# Patient Record
Sex: FEMALE | Race: WHITE
[De-identification: names, ages, dates, MRNs, and addresses within clinical notes are randomized per-mention and may not be internally consistent; named-entity substitution may affect disease eponyms.]

---

## 2019-01-05 ENCOUNTER — HOSPITAL ENCOUNTER (EMERGENCY)
Dept: HOSPITAL 74 - JERFT | Age: 74
Discharge: HOME | End: 2019-01-05
Payer: COMMERCIAL

## 2019-01-05 VITALS — BODY MASS INDEX: 26.5 KG/M2

## 2019-01-05 VITALS — DIASTOLIC BLOOD PRESSURE: 78 MMHG | SYSTOLIC BLOOD PRESSURE: 149 MMHG | HEART RATE: 74 BPM | TEMPERATURE: 97.8 F
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
## 2019-01-05 DIAGNOSIS — S20.222A: Primary | ICD-10-CM
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
## 2019-01-05 DIAGNOSIS — E78.00: ICD-10-CM
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
## 2019-01-06 NOTE — EKG
Test Reason : 

Blood Pressure : ***/*** mmHG

Vent. Rate : 064 BPM     Atrial Rate : 064 BPM

   P-R Int : 144 ms          QRS Dur : 090 ms

    QT Int : 398 ms       P-R-T Axes : 038 -07 003 degrees

   QTc Int : 410 ms

 

NORMAL SINUS RHYTHM

NONSPECIFIC ST ABNORMALITY

BORDERLINE ECG

Confirmed by MD PRUDENCIO, MARY ([id number]) on 1/6/2019 4:58:10 PM

 

Referred By:  DARREN           Confirmed By:MARY FLANNERY MD

## 2022-12-20 ENCOUNTER — OFFICE (OUTPATIENT)
Dept: URBAN - METROPOLITAN AREA CLINIC 30 | Facility: CLINIC | Age: 77
Setting detail: OPHTHALMOLOGY
End: 2022-12-20
Payer: MEDICARE

## 2022-12-20 DIAGNOSIS — H53.2: ICD-10-CM

## 2022-12-20 DIAGNOSIS — H25.12: ICD-10-CM

## 2022-12-20 DIAGNOSIS — H50.22: ICD-10-CM

## 2022-12-20 DIAGNOSIS — H40.1213: ICD-10-CM

## 2022-12-20 DIAGNOSIS — Z96.1: ICD-10-CM

## 2022-12-20 DIAGNOSIS — H40.1222: ICD-10-CM

## 2022-12-20 PROCEDURE — 92012 INTRM OPH EXAM EST PATIENT: CPT | Performed by: OPHTHALMOLOGY

## 2022-12-20 PROCEDURE — 92060 SENSORIMOTOR EXAMINATION: CPT | Performed by: OPHTHALMOLOGY

## 2022-12-20 ASSESSMENT — VISUAL ACUITY
OD_BCVA: 20/50-2
OS_BCVA: 20/25-2

## 2022-12-20 ASSESSMENT — CONFRONTATIONAL VISUAL FIELD TEST (CVF)
OD_FINDINGS: FULL
OS_FINDINGS: FULL

## 2022-12-20 ASSESSMENT — REFRACTION_MANIFEST
OS_VA1: 20/40
OD_CYLINDER: +1.75
OD_VA1: 20/25
OD_VA1: 20/25-2
OD_AXIS: 30
OS_SPHERE: PLANO
OD_SPHERE: -1.00
OS_AXIS: 155
OS_CYLINDER: +0.75
OD_CYLINDER: +0.75
OS_VA1: 20/50-2
OS_SPHERE: 0.00
OD_SPHERE: -1.50
OS_CYLINDER: +0.50
OD_AXIS: 25
OS_AXIS: 150

## 2022-12-20 ASSESSMENT — REFRACTION_CURRENTRX
OS_SPHERE: +2.00
OD_SPHERE: -0.25
OS_CYLINDER: +1.25
OD_CYLINDER: +1.25
OD_CYLINDER: +1.00
OS_OVR_VA: 20/
OD_OVR_VA: 20/
OS_OVR_VA: 20/
OD_AXIS: 15
OS_SPHERE: -0.25
OS_AXIS: 170
OS_CYLINDER: +1.75
OD_AXIS: 180
OD_SPHERE: +2.00
OD_OVR_VA: 20/
OS_AXIS: 160

## 2022-12-20 ASSESSMENT — REFRACTION_AUTOREFRACTION
OD_AXIS: 25
OS_CYLINDER: +0.75
OS_AXIS: 155
OD_CYLINDER: +1.75
OS_SPHERE: 0.00
OD_SPHERE: -1.50

## 2022-12-20 ASSESSMENT — SPHEQUIV_DERIVED
OD_SPHEQUIV: -0.625
OS_SPHEQUIV: 0.375
OD_SPHEQUIV: -0.625
OS_SPHEQUIV: 0.375
OD_SPHEQUIV: -0.625

## 2023-02-08 ENCOUNTER — OFFICE (OUTPATIENT)
Dept: URBAN - METROPOLITAN AREA CLINIC 30 | Facility: CLINIC | Age: 78
Setting detail: OPHTHALMOLOGY
End: 2023-02-08
Payer: MEDICARE

## 2023-02-08 DIAGNOSIS — H25.13: ICD-10-CM

## 2023-02-08 DIAGNOSIS — H40.1222: ICD-10-CM

## 2023-02-08 DIAGNOSIS — H40.1213: ICD-10-CM

## 2023-02-08 DIAGNOSIS — H25.12: ICD-10-CM

## 2023-02-08 DIAGNOSIS — H43.813: ICD-10-CM

## 2023-02-08 DIAGNOSIS — H52.213: ICD-10-CM

## 2023-02-08 DIAGNOSIS — T15.01XA: ICD-10-CM

## 2023-02-08 PROCEDURE — 65222 REMOVE FOREIGN BODY FROM EYE: CPT | Performed by: OPHTHALMOLOGY

## 2023-02-08 PROCEDURE — 92136 OPHTHALMIC BIOMETRY: CPT | Performed by: OPHTHALMOLOGY

## 2023-02-08 PROCEDURE — 92025 CPTRIZED CORNEAL TOPOGRAPHY: CPT | Performed by: OPHTHALMOLOGY

## 2023-02-08 PROCEDURE — 99214 OFFICE O/P EST MOD 30 MIN: CPT | Performed by: OPHTHALMOLOGY

## 2023-02-08 PROCEDURE — 92134 CPTRZ OPH DX IMG PST SGM RTA: CPT | Performed by: OPHTHALMOLOGY

## 2023-02-08 ASSESSMENT — REFRACTION_CURRENTRX
OD_SPHERE: -0.25
OD_AXIS: 180
OS_CYLINDER: +1.25
OS_AXIS: 160
OS_AXIS: 170
OD_AXIS: 15
OD_CYLINDER: +1.00
OS_OVR_VA: 20/
OS_SPHERE: -0.25
OS_SPHERE: +2.00
OD_SPHERE: +2.00
OD_OVR_VA: 20/
OD_OVR_VA: 20/
OS_OVR_VA: 20/
OS_CYLINDER: +1.75
OD_CYLINDER: +1.25

## 2023-02-08 ASSESSMENT — VISUAL ACUITY
OD_BCVA: 20/60
OS_BCVA: 20/30

## 2023-02-08 ASSESSMENT — REFRACTION_MANIFEST
OD_CYLINDER: +0.75
OS_VA1: 20/40
OS_AXIS: 155
OS_VA1: 20/50-2
OD_SPHERE: -1.50
OD_AXIS: 30
OS_CYLINDER: +0.75
OS_SPHERE: PLANO
OD_CYLINDER: +1.75
OS_AXIS: 150
OS_CYLINDER: +0.50
OD_VA1: 20/25-2
OD_VA1: 20/25
OD_AXIS: 25
OS_SPHERE: 0.00
OD_SPHERE: -1.00

## 2023-02-08 ASSESSMENT — SPHEQUIV_DERIVED
OS_SPHEQUIV: 0.375
OD_SPHEQUIV: -0.625
OD_SPHEQUIV: -0.625
OS_SPHEQUIV: 0.375
OD_SPHEQUIV: -0.625

## 2023-02-08 ASSESSMENT — REFRACTION_AUTOREFRACTION
OD_AXIS: 25
OS_SPHERE: 0.00
OD_SPHERE: -1.50
OS_CYLINDER: +0.75
OS_AXIS: 155
OD_CYLINDER: +1.75

## 2023-02-08 ASSESSMENT — CORNEAL TRAUMA - FOREIGN BODY: OD_FOREIGNBODY: PRESENT

## 2023-02-08 ASSESSMENT — CONFRONTATIONAL VISUAL FIELD TEST (CVF)
OS_FINDINGS: FULL
OD_FINDINGS: FULL

## 2023-04-12 ENCOUNTER — OFFICE (OUTPATIENT)
Dept: URBAN - METROPOLITAN AREA CLINIC 30 | Facility: CLINIC | Age: 78
Setting detail: OPHTHALMOLOGY
End: 2023-04-12
Payer: MEDICARE

## 2023-04-12 DIAGNOSIS — H52.213: ICD-10-CM

## 2023-04-12 DIAGNOSIS — H40.1222: ICD-10-CM

## 2023-04-12 DIAGNOSIS — H50.22: ICD-10-CM

## 2023-04-12 DIAGNOSIS — Z96.1: ICD-10-CM

## 2023-04-12 DIAGNOSIS — H43.813: ICD-10-CM

## 2023-04-12 DIAGNOSIS — H53.2: ICD-10-CM

## 2023-04-12 DIAGNOSIS — T15.01XA: ICD-10-CM

## 2023-04-12 DIAGNOSIS — H40.1213: ICD-10-CM

## 2023-04-12 DIAGNOSIS — H25.12: ICD-10-CM

## 2023-04-12 DIAGNOSIS — E11.9: ICD-10-CM

## 2023-04-12 PROCEDURE — 92020 GONIOSCOPY: CPT | Performed by: OPHTHALMOLOGY

## 2023-04-12 PROCEDURE — 99213 OFFICE O/P EST LOW 20 MIN: CPT | Performed by: OPHTHALMOLOGY

## 2023-04-12 ASSESSMENT — REFRACTION_CURRENTRX
OS_OVR_VA: 20/
OD_SPHERE: +2.00
OS_AXIS: 160
OS_CYLINDER: +1.75
OD_OVR_VA: 20/
OS_OVR_VA: 20/
OD_CYLINDER: +1.25
OS_CYLINDER: +1.25
OD_CYLINDER: +1.00
OD_AXIS: 180
OD_SPHERE: -0.25
OD_AXIS: 15
OS_SPHERE: +2.00
OS_AXIS: 170
OD_OVR_VA: 20/
OS_SPHERE: -0.25

## 2023-04-12 ASSESSMENT — CORNEAL TRAUMA - FOREIGN BODY: OD_FOREIGNBODY: PRESENT

## 2023-04-12 ASSESSMENT — REFRACTION_MANIFEST
OS_SPHERE: PLANO
OS_VA1: 20/40
OS_AXIS: 155
OD_AXIS: 25
OS_CYLINDER: +0.75
OS_SPHERE: 0.00
OD_CYLINDER: +0.75
OS_AXIS: 150
OD_AXIS: 30
OS_CYLINDER: +0.50
OD_SPHERE: -1.00
OD_CYLINDER: +1.75
OS_VA1: 20/50-2
OD_VA1: 20/25-2
OD_VA1: 20/25
OD_SPHERE: -1.50

## 2023-04-12 ASSESSMENT — REFRACTION_AUTOREFRACTION
OD_CYLINDER: +1.75
OS_AXIS: 155
OD_AXIS: 25
OS_SPHERE: 0.00
OD_SPHERE: -1.50
OS_CYLINDER: +0.75

## 2023-04-12 ASSESSMENT — VISUAL ACUITY
OS_BCVA: 20/30
OD_BCVA: 20/60

## 2023-04-12 ASSESSMENT — SPHEQUIV_DERIVED
OS_SPHEQUIV: 0.375
OD_SPHEQUIV: -0.625
OS_SPHEQUIV: 0.375
OD_SPHEQUIV: -0.625
OD_SPHEQUIV: -0.625

## 2023-05-25 ENCOUNTER — AMBULATORY SURGERY CENTER (OUTPATIENT)
Dept: URBAN - METROPOLITAN AREA SURGERY 17 | Facility: SURGERY | Age: 78
Setting detail: OPHTHALMOLOGY
End: 2023-05-25
Payer: MEDICARE

## 2023-05-25 DIAGNOSIS — H25.12: ICD-10-CM

## 2023-05-25 DIAGNOSIS — H40.1222: ICD-10-CM

## 2023-05-25 PROCEDURE — 66984 XCAPSL CTRC RMVL W/O ECP: CPT | Performed by: OPHTHALMOLOGY

## 2023-05-25 PROCEDURE — 66170 GLAUCOMA SURGERY: CPT | Performed by: OPHTHALMOLOGY

## 2023-05-26 ENCOUNTER — OFFICE (OUTPATIENT)
Dept: URBAN - METROPOLITAN AREA CLINIC 29 | Facility: CLINIC | Age: 78
Setting detail: OPHTHALMOLOGY
End: 2023-05-26
Payer: MEDICARE

## 2023-05-26 ENCOUNTER — RX ONLY (RX ONLY)
Age: 78
End: 2023-05-26

## 2023-05-26 DIAGNOSIS — H43.813: ICD-10-CM

## 2023-05-26 DIAGNOSIS — H40.1222: ICD-10-CM

## 2023-05-26 DIAGNOSIS — H16.222: ICD-10-CM

## 2023-05-26 DIAGNOSIS — H52.213: ICD-10-CM

## 2023-05-26 DIAGNOSIS — E11.9: ICD-10-CM

## 2023-05-26 DIAGNOSIS — H25.12: ICD-10-CM

## 2023-05-26 DIAGNOSIS — H53.2: ICD-10-CM

## 2023-05-26 DIAGNOSIS — H40.1213: ICD-10-CM

## 2023-05-26 DIAGNOSIS — Z96.1: ICD-10-CM

## 2023-05-26 DIAGNOSIS — H50.22: ICD-10-CM

## 2023-05-26 PROCEDURE — 99024 POSTOP FOLLOW-UP VISIT: CPT | Performed by: OPHTHALMOLOGY

## 2023-05-26 ASSESSMENT — REFRACTION_MANIFEST
OS_CYLINDER: +0.75
OS_SPHERE: 0.00
OD_SPHERE: -1.50
OD_VA1: 20/25
OD_CYLINDER: +1.75
OS_SPHERE: PLANO
OD_AXIS: 25
OS_AXIS: 155
OS_VA1: 20/40
OD_SPHERE: -1.00
OD_CYLINDER: +0.75
OS_CYLINDER: +0.50
OD_AXIS: 30
OS_AXIS: 150
OS_VA1: 20/50-2
OD_VA1: 20/25-2

## 2023-05-26 ASSESSMENT — REFRACTION_CURRENTRX
OD_SPHERE: -0.25
OS_OVR_VA: 20/
OS_SPHERE: +2.00
OD_CYLINDER: +1.00
OS_AXIS: 170
OS_CYLINDER: +1.75
OD_OVR_VA: 20/
OD_AXIS: 15
OS_SPHERE: -0.25
OD_SPHERE: +2.00
OD_CYLINDER: +1.25
OS_AXIS: 160
OS_CYLINDER: +1.25
OD_AXIS: 180
OS_OVR_VA: 20/
OD_OVR_VA: 20/

## 2023-05-26 ASSESSMENT — REFRACTION_AUTOREFRACTION
OD_CYLINDER: +1.75
OS_AXIS: 155
OD_AXIS: 25
OS_CYLINDER: +0.75
OD_SPHERE: -1.50
OS_SPHERE: 0.00

## 2023-05-26 ASSESSMENT — VISUAL ACUITY
OS_BCVA: 20/30
OD_BCVA: 20/60

## 2023-05-26 ASSESSMENT — CONFRONTATIONAL VISUAL FIELD TEST (CVF)
OS_FINDINGS: FULL
OD_FINDINGS: FULL

## 2023-05-26 ASSESSMENT — TONOMETRY: OD_IOP_MMHG: 12

## 2023-05-26 ASSESSMENT — CORNEAL TRAUMA - FOREIGN BODY: OD_FOREIGNBODY: PRESENT

## 2023-05-26 ASSESSMENT — SUPERFICIAL PUNCTATE KERATITIS (SPK): OS_SPK: 2+

## 2023-05-26 ASSESSMENT — SPHEQUIV_DERIVED
OS_SPHEQUIV: 0.375
OD_SPHEQUIV: -0.625
OD_SPHEQUIV: -0.625
OS_SPHEQUIV: 0.375
OD_SPHEQUIV: -0.625

## 2023-06-14 ENCOUNTER — OFFICE (OUTPATIENT)
Dept: URBAN - METROPOLITAN AREA CLINIC 30 | Facility: CLINIC | Age: 78
Setting detail: OPHTHALMOLOGY
End: 2023-06-14
Payer: MEDICARE

## 2023-06-14 DIAGNOSIS — H16.222: ICD-10-CM

## 2023-06-14 DIAGNOSIS — Z96.1: ICD-10-CM

## 2023-06-14 DIAGNOSIS — H43.813: ICD-10-CM

## 2023-06-14 DIAGNOSIS — H40.1213: ICD-10-CM

## 2023-06-14 DIAGNOSIS — H25.12: ICD-10-CM

## 2023-06-14 DIAGNOSIS — H50.22: ICD-10-CM

## 2023-06-14 DIAGNOSIS — H52.213: ICD-10-CM

## 2023-06-14 DIAGNOSIS — H53.2: ICD-10-CM

## 2023-06-14 DIAGNOSIS — H40.1222: ICD-10-CM

## 2023-06-14 DIAGNOSIS — E11.9: ICD-10-CM

## 2023-06-14 PROCEDURE — 99024 POSTOP FOLLOW-UP VISIT: CPT | Performed by: OPHTHALMOLOGY

## 2023-06-14 ASSESSMENT — REFRACTION_CURRENTRX
OS_CYLINDER: +1.25
OS_OVR_VA: 20/
OD_OVR_VA: 20/
OS_CYLINDER: +1.75
OD_SPHERE: +2.00
OD_OVR_VA: 20/
OS_SPHERE: -0.25
OS_AXIS: 170
OS_SPHERE: +2.00
OS_AXIS: 160
OD_SPHERE: -0.25
OD_CYLINDER: +1.25
OD_AXIS: 15
OD_AXIS: 180
OD_CYLINDER: +1.00
OS_OVR_VA: 20/

## 2023-06-14 ASSESSMENT — REFRACTION_MANIFEST
OS_AXIS: 155
OS_AXIS: 150
OS_CYLINDER: +0.50
OD_CYLINDER: +0.75
OD_AXIS: 30
OS_CYLINDER: +0.75
OS_SPHERE: 0.00
OD_VA1: 20/25
OD_CYLINDER: +1.75
OS_VA1: 20/50-2
OS_SPHERE: PLANO
OD_AXIS: 25
OD_VA1: 20/25-2
OD_SPHERE: -1.50
OS_VA1: 20/40
OD_SPHERE: -1.00

## 2023-06-14 ASSESSMENT — REFRACTION_AUTOREFRACTION
OD_AXIS: 25
OS_CYLINDER: +0.75
OD_CYLINDER: +1.75
OS_AXIS: 155
OD_SPHERE: -1.50
OS_SPHERE: 0.00

## 2023-06-14 ASSESSMENT — SPHEQUIV_DERIVED
OD_SPHEQUIV: -0.625
OS_SPHEQUIV: 0.375
OS_SPHEQUIV: 0.375

## 2023-06-14 ASSESSMENT — VISUAL ACUITY
OD_BCVA: 20/50
OS_BCVA: 20/30-1

## 2023-06-14 ASSESSMENT — CORNEAL TRAUMA - FOREIGN BODY: OD_FOREIGNBODY: PRESENT

## 2023-06-14 ASSESSMENT — SUPERFICIAL PUNCTATE KERATITIS (SPK): OS_SPK: 2+

## 2023-07-19 ENCOUNTER — OFFICE (OUTPATIENT)
Dept: URBAN - METROPOLITAN AREA CLINIC 30 | Facility: CLINIC | Age: 78
Setting detail: OPHTHALMOLOGY
End: 2023-07-19
Payer: MEDICARE

## 2023-07-19 DIAGNOSIS — H40.1222: ICD-10-CM

## 2023-07-19 DIAGNOSIS — H43.813: ICD-10-CM

## 2023-07-19 PROCEDURE — 99213 OFFICE O/P EST LOW 20 MIN: CPT | Performed by: OPHTHALMOLOGY

## 2023-07-19 PROCEDURE — 92134 CPTRZ OPH DX IMG PST SGM RTA: CPT | Performed by: OPHTHALMOLOGY

## 2023-07-19 ASSESSMENT — REFRACTION_MANIFEST
OS_CYLINDER: +0.50
OD_SPHERE: -1.00
OD_VA1: 20/25-2
OD_AXIS: 30
OD_AXIS: 30
OD_VPRISM: BU
OS_SPHERE: PLANO
OD_CYLINDER: +0.75
OS_VPRISM: BD
OD_CYLINDER: +1.00
OD_SPHERE: -0.75
OS_SPHERE: -1.00
OD_VA1: 20/25
OS_VA1: 20/40
OS_VA1: 20/30-2
OS_CYLINDER: +0.50
OS_AXIS: 10
OS_AXIS: 150

## 2023-07-19 ASSESSMENT — SPHEQUIV_DERIVED
OD_SPHEQUIV: -0.25
OS_SPHEQUIV: -0.75
OD_SPHEQUIV: -0.125
OD_SPHEQUIV: -0.625
OS_SPHEQUIV: -0.75

## 2023-07-19 ASSESSMENT — REFRACTION_AUTOREFRACTION
OD_CYLINDER: +1.75
OS_SPHERE: -1.00
OS_AXIS: 11
OS_CYLINDER: +0.50
OD_SPHERE: -1.00
OD_AXIS: 28

## 2023-07-19 ASSESSMENT — REFRACTION_CURRENTRX
OS_AXIS: 170
OS_SPHERE: -0.25
OS_OVR_VA: 20/
OD_SPHERE: -0.25
OD_AXIS: 180
OD_CYLINDER: +1.25
OS_CYLINDER: +1.75
OS_SPHERE: +2.00
OD_OVR_VA: 20/
OD_AXIS: 15
OD_SPHERE: +2.00
OS_AXIS: 160
OS_OVR_VA: 20/
OS_CYLINDER: +1.25
OD_OVR_VA: 20/
OD_CYLINDER: +1.00

## 2023-07-19 ASSESSMENT — SUPERFICIAL PUNCTATE KERATITIS (SPK): OS_SPK: 2+

## 2023-07-19 ASSESSMENT — CONFRONTATIONAL VISUAL FIELD TEST (CVF)
OS_FINDINGS: FULL
OD_FINDINGS: FULL

## 2023-07-19 ASSESSMENT — VISUAL ACUITY
OS_BCVA: 20/40-1
OD_BCVA: 20/30-2

## 2023-07-19 ASSESSMENT — CORNEAL TRAUMA - FOREIGN BODY: OD_FOREIGNBODY: PRESENT

## 2023-10-25 ENCOUNTER — OFFICE (OUTPATIENT)
Dept: URBAN - METROPOLITAN AREA CLINIC 30 | Facility: CLINIC | Age: 78
Setting detail: OPHTHALMOLOGY
End: 2023-10-25
Payer: MEDICARE

## 2023-10-25 DIAGNOSIS — H40.1222: ICD-10-CM

## 2023-10-25 DIAGNOSIS — H16.222: ICD-10-CM

## 2023-10-25 DIAGNOSIS — H43.813: ICD-10-CM

## 2023-10-25 DIAGNOSIS — H40.1213: ICD-10-CM

## 2023-10-25 PROCEDURE — 99214 OFFICE O/P EST MOD 30 MIN: CPT | Performed by: OPHTHALMOLOGY

## 2023-10-25 PROCEDURE — 92083 EXTENDED VISUAL FIELD XM: CPT | Performed by: OPHTHALMOLOGY

## 2023-10-25 PROCEDURE — 92133 CPTRZD OPH DX IMG PST SGM ON: CPT | Performed by: OPHTHALMOLOGY

## 2023-10-25 ASSESSMENT — REFRACTION_MANIFEST
OS_VPRISM: BD
OS_CYLINDER: +0.50
OS_CYLINDER: +0.50
OD_VA1: 20/25-2
OS_VA1: 20/30-2
OD_VPRISM: BU
OD_VA1: 20/25
OD_CYLINDER: +0.75
OD_CYLINDER: +1.00
OD_AXIS: 30
OS_SPHERE: PLANO
OS_VA1: 20/40
OS_AXIS: 150
OS_SPHERE: -1.00
OD_AXIS: 30
OS_AXIS: 10
OD_SPHERE: -0.75
OD_SPHERE: -1.00

## 2023-10-25 ASSESSMENT — REFRACTION_CURRENTRX
OD_AXIS: 15
OS_OVR_VA: 20/
OS_SPHERE: +2.00
OS_CYLINDER: +1.25
OS_OVR_VA: 20/
OD_AXIS: 180
OD_OVR_VA: 20/
OS_AXIS: 170
OS_SPHERE: -0.25
OD_SPHERE: +2.00
OD_CYLINDER: +1.25
OS_CYLINDER: +1.75
OD_CYLINDER: +1.00
OD_SPHERE: -0.25
OS_AXIS: 160
OD_OVR_VA: 20/

## 2023-10-25 ASSESSMENT — CONFRONTATIONAL VISUAL FIELD TEST (CVF)
OS_FINDINGS: FULL
OD_FINDINGS: FULL

## 2023-10-25 ASSESSMENT — REFRACTION_AUTOREFRACTION
OD_AXIS: 28
OS_CYLINDER: +0.50
OS_SPHERE: -1.00
OS_AXIS: 11
OD_SPHERE: -1.00
OD_CYLINDER: +1.75

## 2023-10-25 ASSESSMENT — CORNEAL TRAUMA - FOREIGN BODY: OD_FOREIGNBODY: PRESENT

## 2023-10-25 ASSESSMENT — TONOMETRY
OS_IOP_MMHG: 13
OD_IOP_MMHG: 12

## 2023-10-25 ASSESSMENT — SPHEQUIV_DERIVED
OD_SPHEQUIV: -0.125
OS_SPHEQUIV: -0.75
OD_SPHEQUIV: -0.625
OD_SPHEQUIV: -0.25
OS_SPHEQUIV: -0.75

## 2023-10-25 ASSESSMENT — SUPERFICIAL PUNCTATE KERATITIS (SPK): OS_SPK: 2+

## 2023-10-25 ASSESSMENT — VISUAL ACUITY
OD_BCVA: 20/40-2
OS_BCVA: 20/40-1

## 2024-02-21 ENCOUNTER — OFFICE (OUTPATIENT)
Dept: URBAN - METROPOLITAN AREA CLINIC 30 | Facility: CLINIC | Age: 79
Setting detail: OPHTHALMOLOGY
End: 2024-02-21
Payer: MEDICARE

## 2024-02-21 DIAGNOSIS — H40.1213: ICD-10-CM

## 2024-02-21 DIAGNOSIS — Z96.1: ICD-10-CM

## 2024-02-21 DIAGNOSIS — H50.22: ICD-10-CM

## 2024-02-21 DIAGNOSIS — H43.813: ICD-10-CM

## 2024-02-21 DIAGNOSIS — H16.223: ICD-10-CM

## 2024-02-21 DIAGNOSIS — H40.1222: ICD-10-CM

## 2024-02-21 DIAGNOSIS — H53.2: ICD-10-CM

## 2024-02-21 DIAGNOSIS — H52.213: ICD-10-CM

## 2024-02-21 DIAGNOSIS — E11.9: ICD-10-CM

## 2024-02-21 PROCEDURE — 92020 GONIOSCOPY: CPT | Performed by: OPHTHALMOLOGY

## 2024-02-21 PROCEDURE — 92012 INTRM OPH EXAM EST PATIENT: CPT | Performed by: OPHTHALMOLOGY

## 2024-02-21 ASSESSMENT — REFRACTION_MANIFEST
OS_AXIS: 150
OD_CYLINDER: +1.00
OS_AXIS: 10
OS_CYLINDER: +0.50
OS_VA1: 20/40
OS_SPHERE: PLANO
OS_VPRISM: BD
OS_SPHERE: -1.00
OD_SPHERE: -1.00
OD_VPRISM: BU
OS_VA1: 20/30-2
OD_VA1: 20/25
OD_CYLINDER: +0.75
OD_AXIS: 30
OD_AXIS: 30
OD_SPHERE: -0.75
OD_VA1: 20/25-2
OS_CYLINDER: +0.50

## 2024-02-21 ASSESSMENT — REFRACTION_CURRENTRX
OS_OVR_VA: 20/
OS_AXIS: 160
OD_VPRISM: BU
OD_AXIS: 180
OS_CYLINDER: +1.25
OD_SPHERE: -0.75
OS_SPHERE: -1.00
OD_SPHERE: +2.00
OD_CYLINDER: +1.00
OD_OVR_VA: 20/
OD_AXIS: 15
OD_CYLINDER: +1.00
OD_OVR_VA: 20/
OS_OVR_VA: 20/
OS_CYLINDER: +1.75
OS_VPRISM: BD
OS_OVR_VA: 20/
OD_OVR_VA: 20/
OD_SPHERE: -0.25
OS_SPHERE: +2.00
OD_AXIS: 30
OD_CYLINDER: +1.25
OS_CYLINDER: +0.50
OS_AXIS: 170
OS_SPHERE: -0.25
OS_AXIS: 10

## 2024-02-21 ASSESSMENT — SPHEQUIV_DERIVED
OD_SPHEQUIV: -0.625
OD_SPHEQUIV: -0.125
OS_SPHEQUIV: -0.75
OD_SPHEQUIV: -0.25
OS_SPHEQUIV: -0.75

## 2024-02-21 ASSESSMENT — REFRACTION_AUTOREFRACTION
OD_CYLINDER: +1.75
OS_SPHERE: -1.00
OD_AXIS: 28
OD_SPHERE: -1.00
OS_AXIS: 11
OS_CYLINDER: +0.50

## 2024-02-21 ASSESSMENT — CONFRONTATIONAL VISUAL FIELD TEST (CVF)
OD_FINDINGS: CONSTRICTION
OS_FINDINGS: FULL

## 2024-02-21 ASSESSMENT — SUPERFICIAL PUNCTATE KERATITIS (SPK)
OD_SPK: 2+
OS_SPK: 2+

## 2024-06-12 ENCOUNTER — OFFICE (OUTPATIENT)
Dept: URBAN - METROPOLITAN AREA CLINIC 30 | Facility: CLINIC | Age: 79
Setting detail: OPHTHALMOLOGY
End: 2024-06-12
Payer: MEDICARE

## 2024-06-12 DIAGNOSIS — H50.22: ICD-10-CM

## 2024-06-12 DIAGNOSIS — H40.1213: ICD-10-CM

## 2024-06-12 DIAGNOSIS — H40.1222: ICD-10-CM

## 2024-06-12 DIAGNOSIS — E11.9: ICD-10-CM

## 2024-06-12 DIAGNOSIS — Z96.1: ICD-10-CM

## 2024-06-12 DIAGNOSIS — H16.223: ICD-10-CM

## 2024-06-12 DIAGNOSIS — H52.213: ICD-10-CM

## 2024-06-12 DIAGNOSIS — H43.813: ICD-10-CM

## 2024-06-12 DIAGNOSIS — H53.2: ICD-10-CM

## 2024-06-12 PROCEDURE — 92083 EXTENDED VISUAL FIELD XM: CPT | Performed by: OPHTHALMOLOGY

## 2024-06-12 PROCEDURE — 92014 COMPRE OPH EXAM EST PT 1/>: CPT | Performed by: OPHTHALMOLOGY

## 2024-06-12 ASSESSMENT — CONFRONTATIONAL VISUAL FIELD TEST (CVF)
OS_FINDINGS: FULL
OD_FINDINGS: CONSTRICTION

## 2024-10-09 ENCOUNTER — OFFICE (OUTPATIENT)
Facility: LOCATION | Age: 79
Setting detail: OPHTHALMOLOGY
End: 2024-10-09
Payer: MEDICARE

## 2024-10-09 DIAGNOSIS — H50.22: ICD-10-CM

## 2024-10-09 DIAGNOSIS — E11.9: ICD-10-CM

## 2024-10-09 DIAGNOSIS — H53.2: ICD-10-CM

## 2024-10-09 DIAGNOSIS — H40.1222: ICD-10-CM

## 2024-10-09 DIAGNOSIS — H52.213: ICD-10-CM

## 2024-10-09 DIAGNOSIS — H16.223: ICD-10-CM

## 2024-10-09 DIAGNOSIS — H40.1213: ICD-10-CM

## 2024-10-09 DIAGNOSIS — H43.813: ICD-10-CM

## 2024-10-09 DIAGNOSIS — Z96.1: ICD-10-CM

## 2024-10-09 PROCEDURE — 99214 OFFICE O/P EST MOD 30 MIN: CPT | Performed by: OPHTHALMOLOGY

## 2024-10-09 PROCEDURE — 92133 CPTRZD OPH DX IMG PST SGM ON: CPT | Performed by: OPHTHALMOLOGY

## 2024-10-09 ASSESSMENT — REFRACTION_CURRENTRX
OS_AXIS: 10
OS_CYLINDER: +1.25
OD_OVR_VA: 20/
OD_VPRISM: BU
OD_SPHERE: +2.00
OD_OVR_VA: 20/
OD_CYLINDER: +1.00
OD_SPHERE: -0.75
OS_OVR_VA: 20/
OS_OVR_VA: 20/
OD_OVR_VA: 20/
OS_AXIS: 160
OS_SPHERE: -1.00
OS_VPRISM: BD
OS_CYLINDER: +0.50
OS_OVR_VA: 20/
OD_AXIS: 30
OD_SPHERE: -0.25
OD_AXIS: 180
OD_AXIS: 15
OD_CYLINDER: +1.25
OS_SPHERE: +2.00
OS_AXIS: 170
OS_CYLINDER: +1.75
OD_CYLINDER: +1.00
OS_SPHERE: -0.25

## 2024-10-09 ASSESSMENT — REFRACTION_MANIFEST
OS_AXIS: 10
OS_SPHERE: PLANO
OS_CYLINDER: +0.50
OD_SPHERE: -0.75
OS_VA1: 20/40
OS_CYLINDER: +0.50
OD_AXIS: 30
OS_AXIS: 150
OS_SPHERE: -1.00
OD_VPRISM: BU
OS_VA1: 20/30-2
OD_VA1: 20/25-2
OD_CYLINDER: +1.00
OD_CYLINDER: +0.75
OS_VPRISM: BD
OD_SPHERE: -1.00
OD_AXIS: 30
OD_VA1: 20/25

## 2024-10-09 ASSESSMENT — CONFRONTATIONAL VISUAL FIELD TEST (CVF)
OD_FINDINGS: FULL
OS_FINDINGS: FULL

## 2024-10-09 ASSESSMENT — REFRACTION_AUTOREFRACTION
OS_CYLINDER: +0.50
OS_AXIS: 11
OD_SPHERE: -1.00
OD_AXIS: 28
OD_CYLINDER: +1.75
OS_SPHERE: -1.00

## 2024-10-09 ASSESSMENT — SUPERFICIAL PUNCTATE KERATITIS (SPK)
OS_SPK: T
OD_SPK: T

## 2024-10-09 ASSESSMENT — VISUAL ACUITY
OS_BCVA: 20/30-2
OD_BCVA: 20/50+2

## 2025-01-15 ENCOUNTER — OFFICE (OUTPATIENT)
Facility: LOCATION | Age: 80
Setting detail: OPHTHALMOLOGY
End: 2025-01-15
Payer: MEDICARE

## 2025-01-15 DIAGNOSIS — H40.1213: ICD-10-CM

## 2025-01-15 DIAGNOSIS — Z96.1: ICD-10-CM

## 2025-01-15 DIAGNOSIS — H40.1222: ICD-10-CM

## 2025-01-15 DIAGNOSIS — H50.22: ICD-10-CM

## 2025-01-15 DIAGNOSIS — H43.813: ICD-10-CM

## 2025-01-15 DIAGNOSIS — H52.213: ICD-10-CM

## 2025-01-15 DIAGNOSIS — E11.9: ICD-10-CM

## 2025-01-15 DIAGNOSIS — H53.2: ICD-10-CM

## 2025-01-15 DIAGNOSIS — H16.223: ICD-10-CM

## 2025-01-15 PROCEDURE — 99213 OFFICE O/P EST LOW 20 MIN: CPT | Performed by: OPHTHALMOLOGY

## 2025-01-15 PROCEDURE — 92083 EXTENDED VISUAL FIELD XM: CPT | Performed by: OPHTHALMOLOGY

## 2025-01-15 PROCEDURE — 92020 GONIOSCOPY: CPT | Performed by: OPHTHALMOLOGY

## 2025-01-15 ASSESSMENT — REFRACTION_AUTOREFRACTION
OS_AXIS: 11
OD_CYLINDER: +1.75
OS_SPHERE: -1.00
OD_SPHERE: -1.00
OS_CYLINDER: +0.50
OD_AXIS: 28

## 2025-01-15 ASSESSMENT — REFRACTION_MANIFEST
OS_CYLINDER: +0.50
OS_AXIS: 10
OD_VA1: 20/25
OD_VPRISM: BU
OS_VPRISM: BD
OS_CYLINDER: +0.50
OD_VA1: 20/25-2
OD_CYLINDER: +1.00
OD_SPHERE: -0.75
OD_SPHERE: -1.00
OD_CYLINDER: +0.75
OS_VA1: 20/30-2
OD_AXIS: 30
OS_VA1: 20/40
OS_SPHERE: -1.00
OS_SPHERE: PLANO
OD_AXIS: 30
OS_AXIS: 150

## 2025-01-15 ASSESSMENT — REFRACTION_CURRENTRX
OD_SPHERE: -0.25
OS_OVR_VA: 20/
OD_VPRISM: BU
OS_OVR_VA: 20/
OD_OVR_VA: 20/
OD_OVR_VA: 20/
OS_SPHERE: -0.25
OS_CYLINDER: +1.25
OS_CYLINDER: +1.75
OS_AXIS: 170
OS_SPHERE: +2.00
OS_VPRISM: BD
OD_CYLINDER: +1.00
OD_AXIS: 15
OD_SPHERE: +2.00
OS_AXIS: 10
OD_SPHERE: -0.75
OS_CYLINDER: +0.50
OD_CYLINDER: +1.00
OD_AXIS: 30
OS_AXIS: 160
OD_AXIS: 180
OS_SPHERE: -1.00
OS_OVR_VA: 20/
OD_OVR_VA: 20/
OD_CYLINDER: +1.25

## 2025-01-15 ASSESSMENT — CONFRONTATIONAL VISUAL FIELD TEST (CVF)
OS_FINDINGS: FULL
OD_FINDINGS: FULL

## 2025-01-15 ASSESSMENT — VISUAL ACUITY
OS_BCVA: 20/40
OD_BCVA: 20/50+2

## 2025-01-15 ASSESSMENT — SUPERFICIAL PUNCTATE KERATITIS (SPK)
OD_SPK: T
OS_SPK: T

## 2025-01-15 ASSESSMENT — TONOMETRY: OD_IOP_MMHG: 12

## 2025-05-14 ENCOUNTER — OFFICE (OUTPATIENT)
Facility: LOCATION | Age: 80
Setting detail: OPHTHALMOLOGY
End: 2025-05-14
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
## 2025-05-14 PROCEDURE — NO SHOW FE NO SHOW FEE: Performed by: OPHTHALMOLOGY

## 2025-06-02 ENCOUNTER — OFFICE (OUTPATIENT)
Facility: LOCATION | Age: 80
Setting detail: OPHTHALMOLOGY
End: 2025-06-02
Payer: MEDICARE

## 2025-06-02 DIAGNOSIS — Z96.1: ICD-10-CM

## 2025-06-02 DIAGNOSIS — H53.2: ICD-10-CM

## 2025-06-02 DIAGNOSIS — H40.1222: ICD-10-CM

## 2025-06-02 DIAGNOSIS — H52.213: ICD-10-CM

## 2025-06-02 DIAGNOSIS — E11.9: ICD-10-CM

## 2025-06-02 DIAGNOSIS — H00.11: ICD-10-CM

## 2025-06-02 DIAGNOSIS — H43.813: ICD-10-CM

## 2025-06-02 DIAGNOSIS — H16.223: ICD-10-CM

## 2025-06-02 DIAGNOSIS — H40.1213: ICD-10-CM

## 2025-06-02 DIAGNOSIS — H50.22: ICD-10-CM

## 2025-06-02 PROCEDURE — 92012 INTRM OPH EXAM EST PATIENT: CPT | Performed by: OPHTHALMOLOGY

## 2025-06-02 ASSESSMENT — REFRACTION_AUTOREFRACTION
OS_SPHERE: -1.00
OD_AXIS: 28
OD_CYLINDER: +1.75
OS_CYLINDER: +0.50
OS_AXIS: 11
OD_SPHERE: -1.00

## 2025-06-02 ASSESSMENT — REFRACTION_MANIFEST
OD_SPHERE: -1.00
OD_VA1: 20/25
OD_SPHERE: -0.75
OD_CYLINDER: +1.00
OS_SPHERE: -1.00
OD_VPRISM: BU
OS_SPHERE: PLANO
OS_AXIS: 10
OD_AXIS: 30
OS_VA1: 20/30-2
OD_VA1: 20/25-2
OD_CYLINDER: +0.75
OS_AXIS: 150
OS_CYLINDER: +0.50
OS_CYLINDER: +0.50
OS_VPRISM: BD
OD_AXIS: 30
OS_VA1: 20/40

## 2025-06-02 ASSESSMENT — REFRACTION_CURRENTRX
OD_CYLINDER: +1.00
OS_OVR_VA: 20/
OD_CYLINDER: +1.25
OS_SPHERE: -1.00
OS_SPHERE: -0.25
OD_AXIS: 15
OD_AXIS: 180
OD_OVR_VA: 20/
OD_OVR_VA: 20/
OD_SPHERE: +2.00
OD_SPHERE: -0.25
OS_VPRISM: BD
OS_CYLINDER: +1.75
OS_OVR_VA: 20/
OS_AXIS: 10
OD_OVR_VA: 20/
OS_OVR_VA: 20/
OS_AXIS: 160
OS_CYLINDER: +1.25
OD_VPRISM: BU
OS_SPHERE: +2.00
OS_CYLINDER: +0.50
OS_AXIS: 170
OD_AXIS: 30
OD_SPHERE: -0.75
OD_CYLINDER: +1.00

## 2025-06-02 ASSESSMENT — SUPERFICIAL PUNCTATE KERATITIS (SPK)
OS_SPK: 1+
OD_SPK: 1+

## 2025-06-02 ASSESSMENT — CONFRONTATIONAL VISUAL FIELD TEST (CVF)
OS_FINDINGS: FULL
OD_FINDINGS: FULL

## 2025-06-02 ASSESSMENT — VISUAL ACUITY
OD_BCVA: 20/50+2
OS_BCVA: 20/40

## 2025-08-20 ENCOUNTER — OFFICE (OUTPATIENT)
Facility: LOCATION | Age: 80
Setting detail: OPHTHALMOLOGY
End: 2025-08-20
Payer: MEDICARE

## 2025-08-20 DIAGNOSIS — H43.813: ICD-10-CM

## 2025-08-20 DIAGNOSIS — H40.1222: ICD-10-CM

## 2025-08-20 DIAGNOSIS — E11.9: ICD-10-CM

## 2025-08-20 DIAGNOSIS — Z96.1: ICD-10-CM

## 2025-08-20 DIAGNOSIS — H50.22: ICD-10-CM

## 2025-08-20 DIAGNOSIS — H00.11: ICD-10-CM

## 2025-08-20 DIAGNOSIS — H16.223: ICD-10-CM

## 2025-08-20 DIAGNOSIS — H52.213: ICD-10-CM

## 2025-08-20 DIAGNOSIS — H40.1213: ICD-10-CM

## 2025-08-20 DIAGNOSIS — H53.2: ICD-10-CM

## 2025-08-20 PROCEDURE — 11900 INJECT SKIN LESIONS </W 7: CPT | Mod: E3 | Performed by: OPHTHALMOLOGY

## 2025-08-20 PROCEDURE — 92014 COMPRE OPH EXAM EST PT 1/>: CPT | Mod: 25 | Performed by: OPHTHALMOLOGY

## 2025-08-20 ASSESSMENT — SUPERFICIAL PUNCTATE KERATITIS (SPK)
OS_SPK: 1+
OD_SPK: 1+

## 2025-08-20 ASSESSMENT — TONOMETRY
OD_IOP_MMHG: 12
OS_IOP_MMHG: 13

## 2025-08-21 ASSESSMENT — REFRACTION_CURRENTRX
OD_AXIS: 180
OD_OVR_VA: 20/
OS_OVR_VA: 20/
OD_SPHERE: +2.00
OS_AXIS: 160
OD_AXIS: 15
OD_CYLINDER: +1.25
OS_OVR_VA: 20/
OD_CYLINDER: +1.00
OS_CYLINDER: +0.50
OS_AXIS: 170
OS_AXIS: 10
OS_CYLINDER: +1.25
OD_AXIS: 30
OD_CYLINDER: +1.00
OD_SPHERE: -0.25
OS_OVR_VA: 20/
OD_VPRISM: BU
OS_SPHERE: +2.00
OD_OVR_VA: 20/
OS_VPRISM: BD
OS_SPHERE: -1.00
OD_OVR_VA: 20/
OD_SPHERE: -0.75
OS_SPHERE: -0.25
OS_CYLINDER: +1.75

## 2025-08-21 ASSESSMENT — REFRACTION_MANIFEST
OS_SPHERE: -1.00
OD_AXIS: 30
OD_VA1: 20/25
OD_VA1: 20/25-2
OD_CYLINDER: +0.75
OS_AXIS: 10
OS_VPRISM: BD
OS_SPHERE: PLANO
OD_CYLINDER: +1.00
OS_VA1: 20/30-2
OS_CYLINDER: +0.50
OD_SPHERE: -1.00
OD_SPHERE: -0.75
OD_AXIS: 30
OD_VPRISM: BU
OS_AXIS: 150
OS_VA1: 20/40
OS_CYLINDER: +0.50

## 2025-08-21 ASSESSMENT — REFRACTION_AUTOREFRACTION
OD_CYLINDER: +1.75
OS_AXIS: 11
OD_SPHERE: -1.00
OS_SPHERE: -1.00
OS_CYLINDER: +0.50
OD_AXIS: 28

## 2025-08-21 ASSESSMENT — VISUAL ACUITY
OD_BCVA: 20/50
OS_BCVA: 20/40-1